# Patient Record
Sex: MALE | Race: WHITE | NOT HISPANIC OR LATINO | Employment: FULL TIME | URBAN - METROPOLITAN AREA
[De-identification: names, ages, dates, MRNs, and addresses within clinical notes are randomized per-mention and may not be internally consistent; named-entity substitution may affect disease eponyms.]

---

## 2017-03-06 ENCOUNTER — OFFICE VISIT (OUTPATIENT)
Dept: FAMILY MEDICINE CLINIC | Facility: CLINIC | Age: 57
End: 2017-03-06

## 2017-03-06 VITALS
TEMPERATURE: 98.3 F | HEART RATE: 74 BPM | HEIGHT: 71 IN | BODY MASS INDEX: 26.04 KG/M2 | DIASTOLIC BLOOD PRESSURE: 89 MMHG | RESPIRATION RATE: 16 BRPM | SYSTOLIC BLOOD PRESSURE: 150 MMHG | WEIGHT: 186 LBS

## 2017-03-06 DIAGNOSIS — R03.0 ELEVATED BLOOD-PRESSURE READING WITHOUT DIAGNOSIS OF HYPERTENSION: ICD-10-CM

## 2017-03-06 DIAGNOSIS — J30.1 SEASONAL ALLERGIC RHINITIS DUE TO POLLEN: Primary | ICD-10-CM

## 2017-03-06 DIAGNOSIS — Z12.5 SCREENING FOR PROSTATE CANCER: ICD-10-CM

## 2017-03-06 PROCEDURE — 99202 OFFICE O/P NEW SF 15 MIN: CPT | Performed by: FAMILY MEDICINE

## 2017-03-06 RX ORDER — FLUTICASONE PROPIONATE 50 MCG
2 SPRAY, SUSPENSION (ML) NASAL DAILY
COMMUNITY

## 2017-03-06 RX ORDER — CETIRIZINE HYDROCHLORIDE 10 MG/1
10 TABLET ORAL DAILY
COMMUNITY

## 2017-03-06 NOTE — PROGRESS NOTES
"Subjective   Anibal Giles is a 56 y.o. male.     CC: Reestablishment of Care for AR and Elevated BPs    History of Present Illness     Chief Complaint:   Chief Complaint   Patient presents with   • Allergic Rhinitis     colonoscopy due        Anibal Giles 56 y.o. male who presents today to reestablish care for Medical Management of the below listed issues and medication refills.  he has a history of   Patient Active Problem List   Diagnosis   • Seasonal allergic rhinitis due to pollen   • Elevated blood-pressure reading without diagnosis of hypertension   .  Since the last visit with his prior MD, he has overall felt well.  he has been compliant with   Current Outpatient Prescriptions:   •  cetirizine (zyrTEC) 10 MG tablet, Take 10 mg by mouth Daily., Disp: , Rfl:   •  fluticasone (FLONASE) 50 MCG/ACT nasal spray, 2 sprays into each nostril Daily., Disp: , Rfl: .  he denies medication side effects.    All of the chronic condition(s) listed above are stable w/o issues.    Visit Vitals   • /89   • Pulse 74   • Temp 98.3 °F (36.8 °C) (Oral)   • Resp 16   • Ht 71\" (180.3 cm)   • Wt 186 lb (84.4 kg)   • BMI 25.94 kg/m2       No results found for this or any previous visit.      The following portions of the patient's history were reviewed and updated as appropriate: allergies, current medications, past family history, past medical history, past social history, past surgical history and problem list.    Review of Systems   Constitutional: Negative for activity change, chills, fatigue and fever.   Respiratory: Negative for cough and chest tightness.    Cardiovascular: Negative for chest pain and palpitations.   Gastrointestinal: Negative for abdominal pain and nausea.   Endocrine: Negative for cold intolerance and polydipsia.   Psychiatric/Behavioral: Negative for behavioral problems and dysphoric mood.   All other systems reviewed and are negative.    Visit Vitals   • /89   • Pulse 74   • Temp 98.3 °F (36.8 °C) " "(Oral)   • Resp 16   • Ht 71\" (180.3 cm)   • Wt 186 lb (84.4 kg)   • BMI 25.94 kg/m2       Objective   Physical Exam   Constitutional: He appears well-developed and well-nourished.   Neck: Neck supple. No thyromegaly present.   Cardiovascular: Normal rate and regular rhythm.    No murmur heard.  Pulmonary/Chest: Effort normal and breath sounds normal.   Abdominal: Bowel sounds are normal.   Psychiatric: He has a normal mood and affect. His behavior is normal.   Nursing note and vitals reviewed.      Assessment/Plan   Anibal was seen today for allergic rhinitis.    Diagnoses and all orders for this visit:    Seasonal allergic rhinitis due to pollen    Elevated blood-pressure reading without diagnosis of hypertension  -     Comprehensive metabolic panel  -     Lipid panel  -     CBC and Differential  -     TSH    Screening for prostate cancer  -     PSA      Diet/exercise discussed.       "